# Patient Record
Sex: MALE | Race: WHITE | NOT HISPANIC OR LATINO | URBAN - METROPOLITAN AREA
[De-identification: names, ages, dates, MRNs, and addresses within clinical notes are randomized per-mention and may not be internally consistent; named-entity substitution may affect disease eponyms.]

---

## 2023-05-16 PROBLEM — 428283002: Status: ACTIVE | Noted: 2023-05-16

## 2023-05-19 ENCOUNTER — OFFICE VISIT (OUTPATIENT)
Dept: URBAN - METROPOLITAN AREA CLINIC 74 | Facility: CLINIC | Age: 83
End: 2023-05-19
Payer: MEDICARE

## 2023-05-19 ENCOUNTER — LAB OUTSIDE AN ENCOUNTER (OUTPATIENT)
Dept: URBAN - METROPOLITAN AREA CLINIC 74 | Facility: CLINIC | Age: 83
End: 2023-05-19

## 2023-05-19 VITALS
HEART RATE: 60 BPM | SYSTOLIC BLOOD PRESSURE: 140 MMHG | DIASTOLIC BLOOD PRESSURE: 68 MMHG | WEIGHT: 191.8 LBS | BODY MASS INDEX: 28.41 KG/M2 | TEMPERATURE: 97.6 F | HEIGHT: 69 IN

## 2023-05-19 DIAGNOSIS — K31.7 MULTIPLE GASTRIC POLYPS: ICD-10-CM

## 2023-05-19 DIAGNOSIS — Z86.010 PERSONAL HISTORY OF COLONIC POLYPS: ICD-10-CM

## 2023-05-19 DIAGNOSIS — K59.09 CHRONIC CONSTIPATION: ICD-10-CM

## 2023-05-19 DIAGNOSIS — K21.9 CHRONIC GERD: ICD-10-CM

## 2023-05-19 PROBLEM — 87252009: Status: ACTIVE | Noted: 2023-05-19

## 2023-05-19 PROBLEM — 235595009: Status: ACTIVE | Noted: 2023-05-19

## 2023-05-19 PROCEDURE — 99203 OFFICE O/P NEW LOW 30 MIN: CPT | Performed by: PHYSICIAN ASSISTANT

## 2023-05-19 RX ORDER — PANTOPRAZOLE SODIUM 40 MG/1
TABLET, DELAYED RELEASE ORAL
Qty: 0 | Refills: 0 | Status: ACTIVE | COMMUNITY
Start: 1900-01-01

## 2023-05-19 RX ORDER — POLYETHYLENE GLYCOL 3350 17 G/17G
AS DIRECTED POWDER, FOR SOLUTION ORAL
Qty: 238 G | Refills: 0 | OUTPATIENT
Start: 2023-05-19 | End: 2023-05-20

## 2023-05-19 RX ORDER — CARVEDILOL 12.5 MG/1
TABLET, FILM COATED ORAL
Qty: 0 | Refills: 0 | Status: ACTIVE | COMMUNITY
Start: 1900-01-01

## 2023-05-19 RX ORDER — HYDRALAZINE HYDROCHLORIDE 25 MG/1
TABLET ORAL
Qty: 0 | Refills: 0 | Status: ACTIVE | COMMUNITY
Start: 1900-01-01

## 2023-05-19 RX ORDER — ROSUVASTATIN CALCIUM 10 MG
TABLET ORAL
Qty: 0 | Refills: 0 | Status: ACTIVE | COMMUNITY
Start: 1900-01-01

## 2023-05-19 NOTE — HPI-TODAY'S VISIT:
The patient is 82-year-old male with past medical history as noted below known to Dr. Gotti is presenting to our clinic today with constipation. The patient  has been having problem with constipation within the past 4-5 weeks. He is only to move his bowel with OTC Laxataive.  He denies hematochezia. No other GI issues today.   -- The patient denies dyspepsia, dysphagia, odynophagia, hemoptysis, hematemesis, nausea, vomiting, regurgitation, melena, diarrhea, abdominal pain, hematochezia, fever, chills, chest pain, SOB, or any other GI complaints today. -- The patient denies ETOH, Tobacco, and Illicit drug use.  -- The patient is up to date with Flu, Pneumonia, Shingles, and COVID vaccine 3/3. -- Denies NSAID's.    Procedures: -- Colonoscopy with polypectomy on 04/12/2019 by Dr. Gotti noted one diminute polyp in the ascending colon, removed with a cold biopsy forceps. Resected and retrieved. One diminute poly in the transverse colon, removed with a cold biopsy forceps. Resected and retrieved. Diverticulosis in the sigmoid colon and in the descending colon. The distal rectum and anal verge are normal on retroflexion view. Repeat Colonoscopy in 3 years. Biopsy with tubular adenoma colon polyps.  -- EGD with biopsy on 02/26/2016 by Dr. Gotti noted 2 cm hiatal hernia. No gross lesions in the esophagus. Gastritis. A few fundic gastric benign polyps. Biopsy with reactive gastropathy. No H. pylori organisms.

## 2023-05-22 PROBLEM — 236069009: Status: ACTIVE | Noted: 2023-05-22

## 2023-05-22 PROBLEM — 548197911000119104: Status: ACTIVE | Noted: 2023-05-22

## 2023-07-07 ENCOUNTER — OFFICE VISIT (OUTPATIENT)
Dept: URBAN - METROPOLITAN AREA SURGERY CENTER 30 | Facility: SURGERY CENTER | Age: 83
End: 2023-07-07
Payer: MEDICARE

## 2023-07-07 ENCOUNTER — CLAIMS CREATED FROM THE CLAIM WINDOW (OUTPATIENT)
Dept: URBAN - METROPOLITAN AREA CLINIC 4 | Facility: CLINIC | Age: 83
End: 2023-07-07
Payer: MEDICARE

## 2023-07-07 DIAGNOSIS — Z86.010 ADENOMAS PERSONAL HISTORY OF COLONIC POLYPS: ICD-10-CM

## 2023-07-07 DIAGNOSIS — D12.2 BENIGN NEOPLASM OF ASCENDING COLON: ICD-10-CM

## 2023-07-07 DIAGNOSIS — K31.7 BENIGN GASTRIC POLYP: ICD-10-CM

## 2023-07-07 DIAGNOSIS — D12.2 ADENOMA OF ASCENDING COLON: ICD-10-CM

## 2023-07-07 DIAGNOSIS — R12 BURNING REFLUX: ICD-10-CM

## 2023-07-07 DIAGNOSIS — D12.4 ADENOMA OF DESCENDING COLON: ICD-10-CM

## 2023-07-07 PROCEDURE — 45385 COLONOSCOPY W/LESION REMOVAL: CPT | Performed by: INTERNAL MEDICINE

## 2023-07-07 PROCEDURE — 88305 TISSUE EXAM BY PATHOLOGIST: CPT | Performed by: PATHOLOGY

## 2023-07-07 PROCEDURE — G8907 PT DOC NO EVENTS ON DISCHARG: HCPCS | Performed by: INTERNAL MEDICINE

## 2023-07-07 PROCEDURE — 43235 EGD DIAGNOSTIC BRUSH WASH: CPT | Performed by: INTERNAL MEDICINE

## 2023-08-01 ENCOUNTER — OFFICE VISIT (OUTPATIENT)
Dept: URBAN - METROPOLITAN AREA CLINIC 74 | Facility: CLINIC | Age: 83
End: 2023-08-01

## 2023-08-01 NOTE — HPI-TODAY'S VISIT:
The patient is 82-year-old male with past medical history as noted below known to Dr. Gotti is presenting to our clinic today to discuss his recent EGD and Colonoscopy results.   -- The patient denies dyspepsia, dysphagia, odynophagia, hemoptysis, hematemesis, nausea, vomiting, regurgitation, melena, diarrhea, abdominal pain, hematochezia, fever, chills, chest pain, SOB, or any other GI complaints today. -- The patient denies ETOH, Tobacco, and Illicit drug use.  -- The patient is up to date with Flu, Pneumonia, Shingles, and COVID vaccine 3/3. -- Denies NSAID's.    Procedures: -- EGD with biopsy on 07/07/2023 by Dr. Gotti noted small hiatal hernia.  Multiple benign gastric cystic fundic polyps.  Normal examined duodenum.  No specimen collected.  -- Colonoscopy with polypectomy on 07/07/2023 by Dr. Gotti noted one 3 mm polyp in the ascending colon, removed with a cold snare.  Resected and retrieved. One 3 mm polyp in the descending colon, removed with a cold snare.  Resected and retrieved.  Diverticulosis in the sigmoid colon and the descending colon.  Internal hemorrhoids.  Repeat colonoscopy in 5 years interval.  Biopsy with tubular adenoma colon polyps.

## 2024-04-26 ENCOUNTER — OV EP (OUTPATIENT)
Dept: URBAN - METROPOLITAN AREA CLINIC 40 | Facility: CLINIC | Age: 84
End: 2024-04-26
Payer: MEDICARE

## 2024-04-26 VITALS
SYSTOLIC BLOOD PRESSURE: 122 MMHG | WEIGHT: 183.4 LBS | BODY MASS INDEX: 27.16 KG/M2 | HEIGHT: 69 IN | HEART RATE: 66 BPM | DIASTOLIC BLOOD PRESSURE: 74 MMHG | TEMPERATURE: 97.5 F

## 2024-04-26 DIAGNOSIS — K64.8 INTERNAL HEMORRHOIDS: ICD-10-CM

## 2024-04-26 DIAGNOSIS — K31.7 MULTIPLE GASTRIC POLYPS: ICD-10-CM

## 2024-04-26 DIAGNOSIS — K21.9 CHRONIC GERD: ICD-10-CM

## 2024-04-26 DIAGNOSIS — K59.04 CHRONIC IDIOPATHIC CONSTIPATION: ICD-10-CM

## 2024-04-26 DIAGNOSIS — Z86.010 PERSONAL HISTORY OF COLONIC POLYPS: ICD-10-CM

## 2024-04-26 DIAGNOSIS — K57.90 DIVERTICULOSIS: ICD-10-CM

## 2024-04-26 PROBLEM — 82934008: Status: ACTIVE | Noted: 2024-04-26

## 2024-04-26 PROCEDURE — 99214 OFFICE O/P EST MOD 30 MIN: CPT | Performed by: INTERNAL MEDICINE

## 2024-04-26 RX ORDER — HYDRALAZINE HYDROCHLORIDE 25 MG/1
TABLET ORAL
Qty: 0 | Refills: 0 | COMMUNITY
Start: 1900-01-01

## 2024-04-26 RX ORDER — LINACLOTIDE 72 UG/1
1 CAPSULE, GELATIN COATED ORAL ONCE A DAY
Qty: 90 | Refills: 3 | OUTPATIENT
Start: 2024-04-26 | End: 2025-04-21

## 2024-04-26 RX ORDER — ROSUVASTATIN CALCIUM 10 MG
TABLET ORAL
Qty: 0 | Refills: 0 | COMMUNITY
Start: 1900-01-01

## 2024-04-26 RX ORDER — PANTOPRAZOLE SODIUM 40 MG/1
TABLET, DELAYED RELEASE ORAL
Qty: 0 | Refills: 0 | COMMUNITY
Start: 1900-01-01

## 2024-04-26 RX ORDER — CARVEDILOL 12.5 MG/1
TABLET, FILM COATED ORAL
Qty: 0 | Refills: 0 | COMMUNITY
Start: 1900-01-01

## 2024-04-26 NOTE — HPI-TODAY'S VISIT:
The patient is 83-year-old male with past medical history as noted below known to me. He is presenting to our clinic today to discuss chronic constipation. Last seen 2023.  -- The patient denies dyspepsia, dysphagia, odynophagia, hemoptysis, hematemesis, nausea, vomiting, regurgitation, melena, diarrhea, abdominal pain, hematochezia, fever, chills, chest pain, SOB, or any other GI complaints today. -- The patient denies ETOH, Tobacco, and Illicit drug use.  -- The patient is up to date with Flu, Pneumonia, Shingles, and COVID vaccine 3/3. -- Denies NSAID's.    Procedures: -- EGD with biopsy on 07/07/2023 by Dr. Gotti noted small hiatal hernia.  Multiple benign gastric cystic fundic polyps.  Normal examined duodenum.  No specimen collected.  -- Colonoscopy with polypectomy on 07/07/2023 by Dr. Gotti noted one 3 mm polyp in the ascending colon, removed with a cold snare.  Resected and retrieved. One 3 mm polyp in the descending colon, removed with a cold snare.  Resected and retrieved.  Diverticulosis in the sigmoid colon and the descending colon.  Internal hemorrhoids.  Repeat colonoscopy in 5 years interval.  Biopsy with tubular adenoma colon polyps.

## 2024-07-01 ENCOUNTER — TELEPHONE ENCOUNTER (OUTPATIENT)
Dept: URBAN - METROPOLITAN AREA CLINIC 40 | Facility: CLINIC | Age: 84
End: 2024-07-01

## 2024-07-01 RX ORDER — LINACLOTIDE 72 UG/1
1 CAPSULE, GELATIN COATED ORAL ONCE A DAY
Qty: 90 | Refills: 3
Start: 2024-04-26 | End: 2025-06-26

## 2024-09-09 ENCOUNTER — TELEPHONE ENCOUNTER (OUTPATIENT)
Dept: URBAN - METROPOLITAN AREA CLINIC 40 | Facility: CLINIC | Age: 84
End: 2024-09-09

## 2024-09-09 RX ORDER — LUBIPROSTONE 24 UG/1
1 CAPSULE CAPSULE, GELATIN COATED ORAL TWICE A DAY
Qty: 180 CAPSULE | Refills: 3 | OUTPATIENT
Start: 2024-09-09 | End: 2025-09-04

## 2024-11-06 ENCOUNTER — OFFICE VISIT (OUTPATIENT)
Dept: URBAN - METROPOLITAN AREA CLINIC 40 | Facility: CLINIC | Age: 84
End: 2024-11-06
Payer: MEDICARE

## 2024-11-06 ENCOUNTER — DASHBOARD ENCOUNTERS (OUTPATIENT)
Age: 84
End: 2024-11-06

## 2024-11-06 VITALS
DIASTOLIC BLOOD PRESSURE: 60 MMHG | TEMPERATURE: 98.4 F | HEIGHT: 69 IN | SYSTOLIC BLOOD PRESSURE: 122 MMHG | HEART RATE: 53 BPM | BODY MASS INDEX: 26.96 KG/M2 | WEIGHT: 182 LBS

## 2024-11-06 DIAGNOSIS — R19.7 ACUTE DIARRHEA: ICD-10-CM

## 2024-11-06 DIAGNOSIS — R14.3 EXCESSIVE FLATUS: ICD-10-CM

## 2024-11-06 PROCEDURE — 99213 OFFICE O/P EST LOW 20 MIN: CPT

## 2024-11-06 RX ORDER — CARVEDILOL 12.5 MG/1
TABLET, FILM COATED ORAL
Qty: 0 | Refills: 0 | COMMUNITY
Start: 1900-01-01

## 2024-11-06 RX ORDER — HYDRALAZINE HYDROCHLORIDE 25 MG/1
TABLET ORAL
Qty: 0 | Refills: 0 | COMMUNITY
Start: 1900-01-01

## 2024-11-06 RX ORDER — LINACLOTIDE 72 UG/1
1 CAPSULE, GELATIN COATED ORAL ONCE A DAY
Qty: 90 | Refills: 3 | COMMUNITY
Start: 2024-04-26 | End: 2025-06-26

## 2024-11-06 RX ORDER — LUBIPROSTONE 24 UG/1
1 CAPSULE CAPSULE, GELATIN COATED ORAL TWICE A DAY
Qty: 180 CAPSULE | Refills: 3 | COMMUNITY
Start: 2024-09-09 | End: 2025-09-04

## 2024-11-06 RX ORDER — ROSUVASTATIN CALCIUM 10 MG
TABLET ORAL
Qty: 0 | Refills: 0 | COMMUNITY
Start: 1900-01-01

## 2024-11-06 RX ORDER — PANTOPRAZOLE SODIUM 40 MG/1
TABLET, DELAYED RELEASE ORAL
Qty: 0 | Refills: 0 | COMMUNITY
Start: 1900-01-01

## 2024-11-06 NOTE — HPI-TODAY'S VISIT:
The patient is 83-year-old male with past medical history as noted below known to Dr. Gotti. Last seen in 2023. Has hx of chronic constipation, previously started on Linzess 72mcg.  Patient reports doing well on Linzess for about 2 months. Then developed daily diarrhea 1month ago. Diarrhea not associated with eating. Diarrhea is very watery and multiple times per day. Did have a few instances of urgency with incontinence. He also noted increased stomach noises and foul smelling flatulence. Did report some darker stools but now brown again. Patient take Imodium. He now hasn't had a BM since Saturday. No sick contacts, recent travel, medication/diet changes. He has stopped Linzess and never started Amitiza.  Procedures: -- EGD with biopsy on 07/07/2023 by Dr. Gotti noted small hiatal hernia.  Multiple benign gastric cystic fundic polyps.  Normal examined duodenum.  No specimen collected.  -- Colonoscopy with polypectomy on 07/07/2023 by Dr. Gotti noted one 3 mm polyp in the ascending colon, removed with a cold snare.  Resected and retrieved. One 3 mm polyp in the descending colon, removed with a cold snare.  Resected and retrieved.  Diverticulosis in the sigmoid colon and the descending colon.  Internal hemorrhoids.  Repeat colonoscopy in 5 years interval.  Biopsy with tubular adenoma colon polyps.

## 2024-11-06 NOTE — PHYSICAL EXAM HENT:
Head, normocephalic, atraumatic, Face, Face within normal limits, Ears, External ears within normal limits
Cervical sprain, initial encounter

## 2024-11-09 ENCOUNTER — LAB OUTSIDE AN ENCOUNTER (OUTPATIENT)
Dept: URBAN - METROPOLITAN AREA CLINIC 40 | Facility: CLINIC | Age: 84
End: 2024-11-09

## 2024-11-12 LAB
ADENOVIRUS F 40/41: NOT DETECTED
CAMPYLOBACTER: NOT DETECTED
CLOSTRIDIUM DIFFICILE: NOT DETECTED
ENTAMOEBA HISTOLYTICA: NOT DETECTED
ENTEROAGGREGATIVE E.COLI: NOT DETECTED
ENTEROTOXIGENIC E.COLI: NOT DETECTED
ESCHERICHIA COLI O157: NOT DETECTED
GIARDIA LAMBLIA: NOT DETECTED
NOROVIRUS GI/GII: NOT DETECTED
ROTAVIRUS A: NOT DETECTED
SALMONELLA SPP.: NOT DETECTED
SHIGA-LIKE TOXIN PRODUCING E.COLI: NOT DETECTED
SHIGELLA SPP. / ENTEROINVASIVE E.COLI: NOT DETECTED
VIBRIO PARAHAEMOLYTICUS: NOT DETECTED
VIBRIO SPP.: NOT DETECTED
YERSINIA ENTEROCOLITICA: NOT DETECTED

## 2024-12-06 ENCOUNTER — OFFICE VISIT (OUTPATIENT)
Dept: URBAN - METROPOLITAN AREA CLINIC 40 | Facility: CLINIC | Age: 84
End: 2024-12-06
Payer: MEDICARE

## 2024-12-06 ENCOUNTER — LAB OUTSIDE AN ENCOUNTER (OUTPATIENT)
Dept: URBAN - METROPOLITAN AREA CLINIC 40 | Facility: CLINIC | Age: 84
End: 2024-12-06

## 2024-12-06 VITALS
BODY MASS INDEX: 27.16 KG/M2 | WEIGHT: 183.4 LBS | OXYGEN SATURATION: 98 % | TEMPERATURE: 97.9 F | SYSTOLIC BLOOD PRESSURE: 122 MMHG | HEART RATE: 83 BPM | DIASTOLIC BLOOD PRESSURE: 60 MMHG | HEIGHT: 69 IN

## 2024-12-06 DIAGNOSIS — K58.0 IRRITABLE BOWEL SYNDROME WITH DIARRHEA: ICD-10-CM

## 2024-12-06 DIAGNOSIS — R10.84 GENERALIZED ABDOMINAL PAIN: ICD-10-CM

## 2024-12-06 DIAGNOSIS — K52.9 CHRONIC DIARRHEA: ICD-10-CM

## 2024-12-06 DIAGNOSIS — R14.3 EXCESSIVE FLATUS: ICD-10-CM

## 2024-12-06 PROCEDURE — 99213 OFFICE O/P EST LOW 20 MIN: CPT

## 2024-12-06 RX ORDER — PANTOPRAZOLE SODIUM 40 MG/1
TABLET, DELAYED RELEASE ORAL
Qty: 0 | Refills: 0 | Status: ACTIVE | COMMUNITY
Start: 1900-01-01

## 2024-12-06 RX ORDER — ROSUVASTATIN CALCIUM 10 MG
TABLET ORAL
Qty: 0 | Refills: 0 | Status: ACTIVE | COMMUNITY
Start: 1900-01-01

## 2024-12-06 RX ORDER — LINACLOTIDE 72 UG/1
1 CAPSULE, GELATIN COATED ORAL ONCE A DAY
Qty: 90 | Refills: 3 | Status: ON HOLD | COMMUNITY
Start: 2024-04-26 | End: 2025-06-26

## 2024-12-06 RX ORDER — CARVEDILOL 12.5 MG/1
TABLET, FILM COATED ORAL
Qty: 0 | Refills: 0 | Status: ACTIVE | COMMUNITY
Start: 1900-01-01

## 2024-12-06 RX ORDER — HYDRALAZINE HYDROCHLORIDE 25 MG/1
TABLET ORAL
Qty: 0 | Refills: 0 | Status: DISCONTINUED | COMMUNITY
Start: 1900-01-01

## 2024-12-06 RX ORDER — LUBIPROSTONE 24 UG/1
1 CAPSULE CAPSULE, GELATIN COATED ORAL TWICE A DAY
Qty: 180 CAPSULE | Refills: 3 | Status: ON HOLD | COMMUNITY
Start: 2024-09-09 | End: 2025-09-04

## 2024-12-06 RX ORDER — RIFAXIMIN 550 MG/1
1 TABLET TABLET ORAL THREE TIMES A DAY
Qty: 42 TABLET | Refills: 0 | OUTPATIENT
Start: 2024-12-06 | End: 2024-12-20

## 2024-12-06 NOTE — PHYSICAL EXAM GASTROINTESTINAL
Abdomen , soft, mid abdominal mild tenderness, nondistended , no guarding or rigidity , no masses palpable , normal bowel sounds , Liver and Spleen,  no hepatosplenomegaly , liver nontender

## 2024-12-06 NOTE — HPI-TODAY'S VISIT:
The patient is 83-year-old male with past medical history as noted below known to Dr. Gotti. Has hx of chronic constipation, previously started on Linzess 72mcg. He developed acute diarrhea that lasted one month. GPP 14 was negative.  -- Patient reports continued loose stools. Has decreased to twice daily. Patient describes stools as watery and urgent. He reports excessive and very foul smelling gas. He has does have abdominal discomfort with abdominal bloating after eating. He denies any weight loss, rectal bleeding, constipation.   Procedures: -- EGD with biopsy on 07/07/2023 by Dr. Gotti noted small hiatal hernia.  Multiple benign gastric cystic fundic polyps.  Normal examined duodenum.  No specimen collected.  -- Colonoscopy with polypectomy on 07/07/2023 by Dr. Gotti noted one 3 mm polyp in the ascending colon, removed with a cold snare.  Resected and retrieved. One 3 mm polyp in the descending colon, removed with a cold snare.  Resected and retrieved.  Diverticulosis in the sigmoid colon and the descending colon.  Internal hemorrhoids.  Repeat colonoscopy in 5 years interval.  Biopsy with tubular adenoma colon polyps.

## 2024-12-09 ENCOUNTER — TELEPHONE ENCOUNTER (OUTPATIENT)
Dept: URBAN - METROPOLITAN AREA CLINIC 40 | Facility: CLINIC | Age: 84
End: 2024-12-09

## 2024-12-11 ENCOUNTER — TELEPHONE ENCOUNTER (OUTPATIENT)
Dept: URBAN - METROPOLITAN AREA CLINIC 74 | Facility: CLINIC | Age: 84
End: 2024-12-11

## 2024-12-11 RX ORDER — METRONIDAZOLE 250 MG/1
1 TABLET TABLET ORAL THREE TIMES A DAY
Qty: 42 TABLET | Refills: 0 | OUTPATIENT
Start: 2024-12-11 | End: 2024-12-25

## 2024-12-27 ENCOUNTER — TELEPHONE ENCOUNTER (OUTPATIENT)
Dept: URBAN - METROPOLITAN AREA CLINIC 40 | Facility: CLINIC | Age: 84
End: 2024-12-27

## 2025-01-31 ENCOUNTER — OFFICE VISIT (OUTPATIENT)
Dept: URBAN - METROPOLITAN AREA CLINIC 40 | Facility: CLINIC | Age: 85
End: 2025-01-31

## 2025-01-31 ENCOUNTER — TELEPHONE ENCOUNTER (OUTPATIENT)
Dept: URBAN - METROPOLITAN AREA CLINIC 40 | Facility: CLINIC | Age: 85
End: 2025-01-31

## 2025-01-31 VITALS
BODY MASS INDEX: 26.75 KG/M2 | TEMPERATURE: 98.3 F | HEART RATE: 67 BPM | WEIGHT: 180.6 LBS | HEIGHT: 69 IN | DIASTOLIC BLOOD PRESSURE: 78 MMHG | SYSTOLIC BLOOD PRESSURE: 132 MMHG

## 2025-01-31 RX ORDER — DICYCLOMINE HYDROCHLORIDE 20 MG/1
1 TABLET TABLET ORAL THREE TIMES A DAY
Qty: 90 | OUTPATIENT
Start: 2025-01-31 | End: 2025-03-02

## 2025-01-31 RX ORDER — ROSUVASTATIN CALCIUM 10 MG
TABLET ORAL
Qty: 0 | Refills: 0 | Status: ACTIVE | COMMUNITY
Start: 1900-01-01

## 2025-01-31 RX ORDER — CARVEDILOL 12.5 MG/1
TABLET, FILM COATED ORAL
Qty: 0 | Refills: 0 | Status: ACTIVE | COMMUNITY
Start: 1900-01-01

## 2025-01-31 RX ORDER — FAMOTIDINE 40 MG/1
1 TABLET TABLET, FILM COATED ORAL TWICE A DAY
Qty: 60 TABLET | Refills: 0 | OUTPATIENT
Start: 2025-01-31

## 2025-01-31 RX ORDER — PANTOPRAZOLE SODIUM 40 MG/1
TABLET, DELAYED RELEASE ORAL
Qty: 0 | Refills: 0 | Status: ACTIVE | COMMUNITY
Start: 1900-01-01

## 2025-01-31 NOTE — HPI-TODAY'S VISIT:
The patient is 83-year-old male with past medical history as noted below known to Dr. Gotti. Has hx of chronic constipation, previously started on Linzess 72mcg. He developed acute diarrhea that lasted one month. GPP 14 was negative.  -- Patient reports continued loose stools. Has decreased to twice daily. Patient describes stools as watery and urgent. He reports excessive and very foul smelling gas. He has does have abdominal discomfort with abdominal bloating after eating. He denies any weight loss, rectal bleeding, constipation.   Imaging: CT abd 12/2024: Fibrotic changes at lung bases. 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 Left renal atrophy, Bilateral cortical scarring left worse then right. Colonic diverticulosis with no active diverticulitis.   Procedures: -- EGD with biopsy on 07/07/2023 by Dr. Gotti noted small hiatal hernia.  Multiple benign gastric cystic fundic polyps.  Normal examined duodenum.  No specimen collected.  -- Colonoscopy with polypectomy on 07/07/2023 by Dr. Gotti noted one 3 mm polyp in the ascending colon, removed with a cold snare.  Resected and retrieved. One 3 mm polyp in the descending colon, removed with a cold snare.  Resected and retrieved.  Diverticulosis in the sigmoid colon and the descending colon.  Internal hemorrhoids.  Repeat colonoscopy in 5 years interval.  Biopsy with tubular adenoma colon polyps.

## 2025-02-04 ENCOUNTER — LAB OUTSIDE AN ENCOUNTER (OUTPATIENT)
Dept: URBAN - METROPOLITAN AREA CLINIC 40 | Facility: CLINIC | Age: 85
End: 2025-02-04

## 2025-02-07 LAB
ADENOVIRUS F 40/41: NOT DETECTED
CALPROTECTIN, STOOL - QDX: (no result)
CAMPYLOBACTER: NOT DETECTED
CLOSTRIDIUM DIFFICILE: NOT DETECTED
ENTAMOEBA HISTOLYTICA: NOT DETECTED
ENTEROAGGREGATIVE E.COLI: NOT DETECTED
ENTEROTOXIGENIC E.COLI: NOT DETECTED
ESCHERICHIA COLI O157: NOT DETECTED
GIARDIA LAMBLIA: NOT DETECTED
NOROVIRUS GI/GII: NOT DETECTED
PANCREATICELASTASE ELISA, STOOL: (no result)
ROTAVIRUS A: NOT DETECTED
SALMONELLA SPP.: NOT DETECTED
SHIGA-LIKE TOXIN PRODUCING E.COLI: NOT DETECTED
SHIGELLA SPP. / ENTEROINVASIVE E.COLI: NOT DETECTED
VIBRIO PARAHAEMOLYTICUS: NOT DETECTED
VIBRIO SPP.: NOT DETECTED
YERSINIA ENTEROCOLITICA: NOT DETECTED

## 2025-02-28 ENCOUNTER — OFFICE VISIT (OUTPATIENT)
Dept: URBAN - METROPOLITAN AREA CLINIC 40 | Facility: CLINIC | Age: 85
End: 2025-02-28
Payer: MEDICARE

## 2025-02-28 VITALS
SYSTOLIC BLOOD PRESSURE: 124 MMHG | BODY MASS INDEX: 27.08 KG/M2 | OXYGEN SATURATION: 97 % | WEIGHT: 182.8 LBS | DIASTOLIC BLOOD PRESSURE: 74 MMHG | TEMPERATURE: 98.1 F | HEART RATE: 58 BPM | HEIGHT: 69 IN

## 2025-02-28 DIAGNOSIS — K52.9 CHRONIC DIARRHEA: ICD-10-CM

## 2025-02-28 DIAGNOSIS — K58.0 IRRITABLE BOWEL SYNDROME WITH DIARRHEA: ICD-10-CM

## 2025-02-28 DIAGNOSIS — K21.9 CHRONIC GERD: ICD-10-CM

## 2025-02-28 DIAGNOSIS — R14.3 EXCESSIVE FLATUS: ICD-10-CM

## 2025-02-28 DIAGNOSIS — K86.81 EXOCRINE PANCREATIC INSUFFICIENCY: ICD-10-CM

## 2025-02-28 DIAGNOSIS — R10.84 GENERALIZED ABDOMINAL PAIN: ICD-10-CM

## 2025-02-28 DIAGNOSIS — R14.0 ABDOMINAL BLOATING: ICD-10-CM

## 2025-02-28 PROBLEM — 47367009: Status: ACTIVE | Noted: 2025-02-28

## 2025-02-28 PROCEDURE — 99213 OFFICE O/P EST LOW 20 MIN: CPT

## 2025-02-28 RX ORDER — PANTOPRAZOLE SODIUM 40 MG/1
TABLET, DELAYED RELEASE ORAL
Qty: 0 | Refills: 0 | Status: ACTIVE | COMMUNITY
Start: 1900-01-01

## 2025-02-28 RX ORDER — FAMOTIDINE 40 MG/1
1 TABLET TABLET, FILM COATED ORAL TWICE A DAY
Qty: 60 TABLET | Refills: 0 | Status: ACTIVE | COMMUNITY
Start: 2025-01-31

## 2025-02-28 RX ORDER — PANCRELIPASE LIPASE, PANCRELIPASE AMYLASE, AND PANCRELIPASE PROTEASE 149900; 37000; 97300 [USP'U]/1; [USP'U]/1; [USP'U]/1
1-2 TABLETS WITH MEALS AND 1 WITH SNACKS CAPSULE, DELAYED RELEASE ORAL
Qty: 90 | Refills: 3 | OUTPATIENT
Start: 2025-02-28 | End: 2025-06-28

## 2025-02-28 RX ORDER — DICYCLOMINE HYDROCHLORIDE 20 MG/1
1 TABLET TABLET ORAL THREE TIMES A DAY
Qty: 90 | Status: ACTIVE | COMMUNITY
Start: 2025-01-31 | End: 2025-03-02

## 2025-02-28 RX ORDER — CARVEDILOL 12.5 MG/1
TABLET, FILM COATED ORAL
Qty: 0 | Refills: 0 | Status: ACTIVE | COMMUNITY
Start: 1900-01-01

## 2025-02-28 RX ORDER — ROSUVASTATIN CALCIUM 10 MG
TABLET ORAL
Qty: 0 | Refills: 0 | Status: ACTIVE | COMMUNITY
Start: 1900-01-01

## 2025-02-28 NOTE — HPI-TODAY'S VISIT:
The patient is 83-year-old male with past medical history as noted below known to Dr. Gotti. Has hx of chronic constipation, previously started on Linzess 72mcg. He has now developed chronic diarrhea. All constipation medications, hydralazine, and PPI were held at some point but diarrhea did not improve.  -- Patient reports continued loose stools. Has decreased to twice daily. Patient describes stools as watery and urgent. He reports excessive and very foul smelling gas, that improved after abx. He does have abdominal discomfort with abdominal bloating after eating. He denies any weight loss, rectal bleeding, constipation. Xifaxan samples given with some improvement.  Labs:  Pancreatic elastase 177, moderately low Normal fecal calprotectin, GPP Negative H pylori breath test   Imaging: -- CT abd 12/2024: Fibrotic changes at lung bases. 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 Left renal atrophy, Bilateral cortical scarring left worse than right. Colonic diverticulosis with no active diverticulitis.  -- Pt aware of aneurysmal aortic root and follows with cardio   Procedures: -- EGD with biopsy on 07/07/2023 by Dr. Gotti noted small hiatal hernia.  Multiple benign gastric cystic fundic polyps.  Normal examined duodenum.  No specimen collected.  -- Colonoscopy with polypectomy on 07/07/2023 by Dr. Gotti noted one 3 mm polyp in the ascending colon, removed with a cold snare.  Resected and retrieved. One 3 mm polyp in the descending colon, removed with a cold snare.  Resected and retrieved.  Diverticulosis in the sigmoid colon and the descending colon.  Internal hemorrhoids.  Repeat colonoscopy in 5 years interval.  Biopsy with tubular adenoma colon polyps.

## 2025-03-03 ENCOUNTER — TELEPHONE ENCOUNTER (OUTPATIENT)
Dept: URBAN - METROPOLITAN AREA CLINIC 74 | Facility: CLINIC | Age: 85
End: 2025-03-03

## 2025-03-03 RX ORDER — PANCRELIPASE LIPASE, PANCRELIPASE AMYLASE, AND PANCRELIPASE PROTEASE 149900; 37000; 97300 [USP'U]/1; [USP'U]/1; [USP'U]/1
TAKE 2 CAPSULES WITH MEALS AND 1 CAPSULE WITH SNACKS CAPSULE, DELAYED RELEASE ORAL THREE TIMES A DAY
Qty: 100 | Refills: 3 | OUTPATIENT
Start: 2025-03-05 | End: 2025-07-03

## 2025-03-07 ENCOUNTER — TELEPHONE ENCOUNTER (OUTPATIENT)
Dept: URBAN - METROPOLITAN AREA CLINIC 74 | Facility: CLINIC | Age: 85
End: 2025-03-07

## 2025-03-12 ENCOUNTER — TELEPHONE ENCOUNTER (OUTPATIENT)
Dept: URBAN - METROPOLITAN AREA CLINIC 74 | Facility: CLINIC | Age: 85
End: 2025-03-12

## 2025-06-25 ENCOUNTER — OFFICE VISIT (OUTPATIENT)
Dept: URBAN - METROPOLITAN AREA CLINIC 40 | Facility: CLINIC | Age: 85
End: 2025-06-25

## 2025-07-02 ENCOUNTER — OFFICE VISIT (OUTPATIENT)
Dept: URBAN - METROPOLITAN AREA CLINIC 40 | Facility: CLINIC | Age: 85
End: 2025-07-02